# Patient Record
Sex: MALE | Race: WHITE | NOT HISPANIC OR LATINO | Employment: OTHER | URBAN - METROPOLITAN AREA
[De-identification: names, ages, dates, MRNs, and addresses within clinical notes are randomized per-mention and may not be internally consistent; named-entity substitution may affect disease eponyms.]

---

## 2022-07-27 ENCOUNTER — OFFICE VISIT (OUTPATIENT)
Dept: UROLOGY | Facility: CLINIC | Age: 67
End: 2022-07-27
Payer: MEDICARE

## 2022-07-27 ENCOUNTER — APPOINTMENT (OUTPATIENT)
Dept: LAB | Facility: MEDICAL CENTER | Age: 67
End: 2022-07-27
Payer: MEDICARE

## 2022-07-27 VITALS
BODY MASS INDEX: 32.65 KG/M2 | OXYGEN SATURATION: 96 % | SYSTOLIC BLOOD PRESSURE: 122 MMHG | WEIGHT: 196 LBS | DIASTOLIC BLOOD PRESSURE: 78 MMHG | HEIGHT: 65 IN | HEART RATE: 57 BPM

## 2022-07-27 DIAGNOSIS — R97.20 ELEVATED PSA: Primary | ICD-10-CM

## 2022-07-27 DIAGNOSIS — R97.20 ELEVATED PSA: ICD-10-CM

## 2022-07-27 LAB
PSA SERPL-MCNC: 3.8 NG/ML (ref 0–4)
SL AMB  POCT GLUCOSE, UA: NORMAL
SL AMB LEUKOCYTE ESTERASE,UA: NORMAL
SL AMB POCT BILIRUBIN,UA: NORMAL
SL AMB POCT BLOOD,UA: NORMAL
SL AMB POCT CLARITY,UA: CLEAR
SL AMB POCT COLOR,UA: YELLOW
SL AMB POCT KETONES,UA: NORMAL
SL AMB POCT NITRITE,UA: NORMAL
SL AMB POCT PH,UA: 7
SL AMB POCT SPECIFIC GRAVITY,UA: 1.02
SL AMB POCT URINE PROTEIN: NORMAL
SL AMB POCT UROBILINOGEN: NORMAL

## 2022-07-27 PROCEDURE — 81002 URINALYSIS NONAUTO W/O SCOPE: CPT | Performed by: UROLOGY

## 2022-07-27 PROCEDURE — 99204 OFFICE O/P NEW MOD 45 MIN: CPT | Performed by: UROLOGY

## 2022-07-27 PROCEDURE — 84153 ASSAY OF PSA TOTAL: CPT

## 2022-07-27 RX ORDER — DILTIAZEM HYDROCHLORIDE 240 MG/1
240 CAPSULE, COATED, EXTENDED RELEASE ORAL DAILY
COMMUNITY
Start: 2022-05-26

## 2022-07-27 RX ORDER — NITROGLYCERIN 0.4 MG/1
TABLET SUBLINGUAL
COMMUNITY
Start: 2022-05-17

## 2022-07-27 RX ORDER — ASPIRIN 325 MG
325 TABLET ORAL
COMMUNITY
End: 2022-07-27 | Stop reason: ALTCHOICE

## 2022-07-27 RX ORDER — LOSARTAN POTASSIUM AND HYDROCHLOROTHIAZIDE 12.5; 5 MG/1; MG/1
1 TABLET ORAL DAILY
COMMUNITY
Start: 2022-05-27

## 2022-07-27 RX ORDER — ATORVASTATIN CALCIUM 80 MG/1
80 TABLET, FILM COATED ORAL DAILY
COMMUNITY
Start: 2022-05-27

## 2022-07-27 NOTE — PROGRESS NOTES
7/27/2022    Emily Uribe  1955  [id number]        Assessment  Elevated PSA, routine prostate cancer screening      Discussion  We discussed his PSA which has doubled in 1 year's time up to 7  15  I recommend repeating the PSA level  If it remains elevated I recommend proceeding immediately with a transrectal ultrasound-guided biopsy of the prostate  Digital rectal examination will be performed at that time  The patient is amenable with this plan and wishes to proceed  History of Present Illness  78 y o  male with a history of a PSA in May of 2021 of 3 44  In May of 2022 his PSA was noted to be 7 15  He denies family history of prostate cancer  He denies any lower urinary tract symptoms or hematuria  He states he is otherwise healthy other than very mild coronary artery disease  This is managed medically  He does not have a stent in his heart and he has never had bypass surgery  He has mild erectile dysfunction  He is retired as an   AUA Symptom Score      Review of Systems  Review of Systems   Constitutional: Negative  HENT: Negative  Eyes: Negative  Respiratory: Negative  Cardiovascular: Negative  Gastrointestinal: Negative  Endocrine: Negative  Genitourinary:        Per HPI   Musculoskeletal: Negative  Skin: Negative  Allergic/Immunologic: Negative  Neurological: Negative  Hematological: Negative  Psychiatric/Behavioral: Negative  Past Medical History  Past Medical History:   Diagnosis Date    Elevated PSA     Hypertension        Past Social History  Past Surgical History:   Procedure Laterality Date    HERNIA REPAIR         Past Family History  History reviewed  No pertinent family history      Past Social history  Social History     Socioeconomic History    Marital status: /Civil Union     Spouse name: Not on file    Number of children: Not on file    Years of education: Not on file    Highest education level: Not on file   Occupational History    Not on file   Tobacco Use    Smoking status: Not on file    Smokeless tobacco: Not on file   Vaping Use    Vaping Use: Never used   Substance and Sexual Activity    Alcohol use: Yes     Alcohol/week: 3 0 standard drinks     Types: 1 Glasses of wine, 2 Cans of beer per week     Comment: once a week    Drug use: Never    Sexual activity: Yes     Partners: Female   Other Topics Concern    Not on file   Social History Narrative    Not on file     Social Determinants of Health     Financial Resource Strain: Not on file   Food Insecurity: Not on file   Transportation Needs: Not on file   Physical Activity: Not on file   Stress: Not on file   Social Connections: Not on file   Intimate Partner Violence: Not on file   Housing Stability: Not on file       Current Medications  Current Outpatient Medications   Medication Sig Dispense Refill    ASPIRIN 81 PO Take by mouth      atorvastatin (LIPITOR) 80 mg tablet Take 80 mg by mouth daily      diltiazem (CARDIZEM CD) 240 mg 24 hr capsule Take 240 mg by mouth daily      losartan-hydrochlorothiazide (HYZAAR) 50-12 5 mg per tablet Take 1 tablet by mouth daily      nitroglycerin (NITROSTAT) 0 4 mg SL tablet TAKE 1 TAB SUBLINGUAL AS DIRECTED FOR CHEST PAIN       No current facility-administered medications for this visit  Allergies  No Known Allergies    Past Medical History, Social History, Family History, medications and allergies were reviewed  Vitals  Vitals:    07/27/22 1053   BP: 122/78   Pulse: 57   SpO2: 96%   Weight: 88 9 kg (196 lb)   Height: 5' 4 5" (1 638 m)       Physical Exam  Physical Exam  On examination he is in no acute distress  Gait normal   Affect normal   Digital rectal examination deferred until the time of prostate biopsy        Results  No results found for: PSA  No results found for: GLUCOSE, CALCIUM, NA, K, CO2, CL, BUN, CREATININE  No results found for: WBC, HGB, HCT, MCV, PLT      Office Urine Dip  Recent Results (from the past 1 hour(s))   POCT urine dip    Collection Time: 07/27/22 11:11 AM   Result Value Ref Range    LEUKOCYTE ESTERASE,UA -     NITRITE,UA -     SL AMB POCT UROBILINOGEN -     POCT URINE PROTEIN -      PH,UA 7 0     BLOOD,UA -     SPECIFIC GRAVITY,UA 1 020     KETONES,UA -     BILIRUBIN,UA -     GLUCOSE, UA -      COLOR,UA yellow     CLARITY,UA clear    ]